# Patient Record
Sex: MALE | Race: OTHER | HISPANIC OR LATINO | Employment: OTHER | URBAN - METROPOLITAN AREA
[De-identification: names, ages, dates, MRNs, and addresses within clinical notes are randomized per-mention and may not be internally consistent; named-entity substitution may affect disease eponyms.]

---

## 2020-10-01 ENCOUNTER — HOSPITAL ENCOUNTER (EMERGENCY)
Facility: HOSPITAL | Age: 60
Discharge: HOME | End: 2020-10-01
Attending: EMERGENCY MEDICINE

## 2020-10-01 VITALS
RESPIRATION RATE: 18 BRPM | WEIGHT: 145.28 LBS | DIASTOLIC BLOOD PRESSURE: 88 MMHG | OXYGEN SATURATION: 98 % | TEMPERATURE: 97.3 F | HEART RATE: 71 BPM | SYSTOLIC BLOOD PRESSURE: 156 MMHG

## 2020-10-01 DIAGNOSIS — M54.9 BACK PAIN: Primary | ICD-10-CM

## 2020-10-01 PROCEDURE — 99283 EMERGENCY DEPT VISIT LOW MDM: CPT

## 2020-10-01 PROCEDURE — 99284 EMERGENCY DEPT VISIT MOD MDM: CPT | Performed by: EMERGENCY MEDICINE

## 2020-10-01 PROCEDURE — 96372 THER/PROPH/DIAG INJ SC/IM: CPT

## 2020-10-01 RX ORDER — LIDOCAINE 50 MG/G
1 PATCH TOPICAL ONCE
Status: DISCONTINUED | OUTPATIENT
Start: 2020-10-01 | End: 2020-10-01 | Stop reason: HOSPADM

## 2020-10-01 RX ORDER — CYCLOBENZAPRINE HCL 10 MG
10 TABLET ORAL 2 TIMES DAILY PRN
Qty: 20 TABLET | Refills: 0 | Status: SHIPPED | OUTPATIENT
Start: 2020-10-01

## 2020-10-01 RX ORDER — CYCLOBENZAPRINE HCL 10 MG
10 TABLET ORAL ONCE
Status: COMPLETED | OUTPATIENT
Start: 2020-10-01 | End: 2020-10-01

## 2020-10-01 RX ORDER — LIDOCAINE 50 MG/G
1 PATCH TOPICAL DAILY
Qty: 5 PATCH | Refills: 0 | Status: SHIPPED | OUTPATIENT
Start: 2020-10-01

## 2020-10-01 RX ORDER — TRAMADOL HYDROCHLORIDE 50 MG/1
50 TABLET ORAL EVERY 6 HOURS PRN
Qty: 8 TABLET | Refills: 0 | Status: SHIPPED | OUTPATIENT
Start: 2020-10-01 | End: 2020-10-03

## 2020-10-01 RX ORDER — KETOROLAC TROMETHAMINE 30 MG/ML
15 INJECTION, SOLUTION INTRAMUSCULAR; INTRAVENOUS ONCE
Status: COMPLETED | OUTPATIENT
Start: 2020-10-01 | End: 2020-10-01

## 2020-10-01 RX ORDER — ACETAMINOPHEN 325 MG/1
975 TABLET ORAL ONCE
Status: COMPLETED | OUTPATIENT
Start: 2020-10-01 | End: 2020-10-01

## 2020-10-01 RX ADMIN — LIDOCAINE 1 PATCH: 50 PATCH TOPICAL at 11:44

## 2020-10-01 RX ADMIN — CYCLOBENZAPRINE HYDROCHLORIDE 10 MG: 10 TABLET, FILM COATED ORAL at 11:42

## 2020-10-01 RX ADMIN — ACETAMINOPHEN 975 MG: 325 TABLET ORAL at 11:42

## 2020-10-01 RX ADMIN — KETOROLAC TROMETHAMINE 15 MG: 30 INJECTION, SOLUTION INTRAMUSCULAR; INTRAVENOUS at 11:41

## 2020-10-02 ENCOUNTER — TELEPHONE (OUTPATIENT)
Dept: PHYSICAL THERAPY | Facility: OTHER | Age: 60
End: 2020-10-02

## 2022-08-30 ENCOUNTER — HOSPITAL ENCOUNTER (EMERGENCY)
Facility: HOSPITAL | Age: 62
Discharge: HOME | End: 2022-08-30
Attending: EMERGENCY MEDICINE

## 2022-08-30 VITALS
TEMPERATURE: 97.5 F | SYSTOLIC BLOOD PRESSURE: 154 MMHG | OXYGEN SATURATION: 97 % | RESPIRATION RATE: 18 BRPM | DIASTOLIC BLOOD PRESSURE: 78 MMHG | HEART RATE: 64 BPM

## 2022-08-30 DIAGNOSIS — L25.5 RHUS DERMATITIS: Primary | ICD-10-CM

## 2022-08-30 PROCEDURE — 99284 EMERGENCY DEPT VISIT MOD MDM: CPT | Performed by: EMERGENCY MEDICINE

## 2022-08-30 PROCEDURE — 96372 THER/PROPH/DIAG INJ SC/IM: CPT

## 2022-08-30 PROCEDURE — 99283 EMERGENCY DEPT VISIT LOW MDM: CPT

## 2022-08-30 RX ORDER — PREDNISONE 10 MG/1
TABLET ORAL
Qty: 55 TABLET | Refills: 0 | Status: SHIPPED | OUTPATIENT
Start: 2022-08-30

## 2022-08-30 RX ORDER — KETOROLAC TROMETHAMINE 30 MG/ML
15 INJECTION, SOLUTION INTRAMUSCULAR; INTRAVENOUS ONCE
Status: COMPLETED | OUTPATIENT
Start: 2022-08-30 | End: 2022-08-30

## 2022-08-30 RX ORDER — ONDANSETRON 4 MG/1
4 TABLET, ORALLY DISINTEGRATING ORAL ONCE
Status: COMPLETED | OUTPATIENT
Start: 2022-08-30 | End: 2022-08-30

## 2022-08-30 RX ADMIN — ONDANSETRON 4 MG: 4 TABLET, ORALLY DISINTEGRATING ORAL at 13:44

## 2022-08-30 RX ADMIN — KETOROLAC TROMETHAMINE 15 MG: 30 INJECTION, SOLUTION INTRAMUSCULAR at 13:44

## 2022-08-30 RX ADMIN — PREDNISONE 50 MG: 20 TABLET ORAL at 13:44

## 2022-08-30 NOTE — ED ATTENDING ATTESTATION
8/30/2022  Cory BRUNO See, DO, saw and evaluated the patient  I have discussed the patient with the resident/non-physician practitioner and agree with the resident's/non-physician practitioner's findings, Plan of Care, and MDM as documented in the resident's/non-physician practitioner's note, except where noted  All available labs and Radiology studies were reviewed  I was present for key portions of any procedure(s) performed by the resident/non-physician practitioner and I was immediately available to provide assistance  At this point I agree with the current assessment done in the Emergency Department  I have conducted an independent evaluation of this patient a history and physical is as follows:  58 y o  male presents rash  Onset two days  Described as pruritic, burning Denies systemic sx other than nausea  Was camping over the weekend  No other modifying factors  Moderate severity  No other associated symptoms  Normal physical exam other than pertinent findings below  Vesicular erythematous rash bilateral shoulders, right arm with linear lesions and skipped areas   C/w rhus dermatitis        A/P: Rhus dermatitis  -calamine   -systemic corticosteroid taper for 14 days                          ED Course         Critical Care Time  Procedures

## 2022-08-30 NOTE — DISCHARGE INSTRUCTIONS
Your workup here was not concerning for anything dangerous  Therefore there is no need for you to stay at the hospital for further testing  We feel safe to send you home  You can use Prednisone for management of your symptoms  You should follow up with your primary care physician to assess for resolution of your symptoms and to determine if there is further evaluation that needs to be performed  Return to the emergency department if you have any symptoms of fevers or worsening rash   It may get worse initially but it will get better

## 2022-08-30 NOTE — ED PROVIDER NOTES
History  Chief Complaint   Patient presents with    Rash     P/t c/o rash  Went camping Saturday  Rash on shoulders and both arms     DILMA Roland is a 58 y o  male with no significant PMH coming in today with complaint of rash  He reports that this started on Saturday after camping trip  He noticed initial redness from what he thinks was contact with a plant on his left dorsum of his hand  It then began spreading to her shoulders and back after scratching his back  Reports chills and associated nausea  No alleviating factors  Reports the rash is painful  Denies any tick bite or sun exposure  No mucous membrane involvement  No recent medication changes  No genital lesions  He reports he smokes cigarettes however does not use other drugs or alcohol use  He has not seen a doctor however has no other known medical problems  No other complaints at this time       - No language barrier  -PFH/PSH reviewed  - History obtained from patient and chart    Prior to Admission Medications   Prescriptions Last Dose Informant Patient Reported? Taking? cyclobenzaprine (FLEXERIL) 10 mg tablet   No No   Sig: Take 1 tablet (10 mg total) by mouth 2 (two) times a day as needed for muscle spasms   lidocaine (LIDODERM) 5 %   No No   Sig: Apply 1 patch topically daily Remove & Discard patch within 12 hours or as directed by MD      Facility-Administered Medications: None       History reviewed  No pertinent past medical history  History reviewed  No pertinent surgical history  History reviewed  No pertinent family history  I have reviewed and agree with the history as documented  E-Cigarette/Vaping     E-Cigarette/Vaping Substances     Social History     Tobacco Use    Smoking status: Current Every Day Smoker     Types: Cigarettes    Smokeless tobacco: Never Used   Substance Use Topics    Alcohol use: Never    Drug use: Never        Review of Systems   Constitutional: Positive for chills  Negative for fever  HENT: Negative for sore throat  Eyes: Negative for pain and visual disturbance  Respiratory: Negative for shortness of breath  Cardiovascular: Negative for chest pain and palpitations  Gastrointestinal: Negative for abdominal pain and vomiting  Genitourinary: Negative for dysuria  Musculoskeletal: Negative for back pain  Skin: Positive for rash  Neurological: Negative for syncope  All other systems reviewed and are negative  Physical Exam  ED Triage Vitals   Temperature Pulse Respirations Blood Pressure SpO2   08/30/22 1148 08/30/22 1148 08/30/22 1148 08/30/22 1229 08/30/22 1148   97 5 °F (36 4 °C) 64 18 154/78 97 %      Temp Source Heart Rate Source Patient Position - Orthostatic VS BP Location FiO2 (%)   08/30/22 1148 08/30/22 1148 08/30/22 1148 08/30/22 1148 --   Tympanic Monitor Sitting Left arm       Pain Score       08/30/22 1148       7             Orthostatic Vital Signs  Vitals:    08/30/22 1148 08/30/22 1229   BP:  154/78   Pulse: 64    Patient Position - Orthostatic VS: Sitting Lying       Physical Exam  Vitals and nursing note reviewed  Constitutional:       Appearance: He is well-developed  HENT:      Head: Normocephalic and atraumatic  Eyes:      Conjunctiva/sclera: Conjunctivae normal    Cardiovascular:      Rate and Rhythm: Normal rate and regular rhythm  Heart sounds: No murmur heard  Pulmonary:      Effort: Pulmonary effort is normal  No respiratory distress  Breath sounds: Normal breath sounds  Abdominal:      Palpations: Abdomen is soft  Tenderness: There is no abdominal tenderness  There is no guarding or rebound  Musculoskeletal:      Cervical back: Neck supple  Skin:     General: Skin is warm and dry  Findings: Erythema and rash present        Comments: Deeply erythematous rash, blanchable, small vesicular lesions noted throughout, located on the back, dorsum of L hand, no mucous membrane involvement, negative lesions on the palms or soles, skip lesions noted on arm and lower back as well   Neurological:      General: No focal deficit present  Mental Status: He is alert  Psychiatric:         Mood and Affect: Mood normal          ED Medications  Medications   ketorolac (TORADOL) injection 15 mg (15 mg Intramuscular Given 8/30/22 1344)   ondansetron (ZOFRAN-ODT) dispersible tablet 4 mg (4 mg Oral Given 8/30/22 1344)   predniSONE tablet 50 mg (50 mg Oral Given 8/30/22 1344)       Diagnostic Studies  Results Reviewed     None                 No orders to display         Procedures  Procedures      ED Course                                       MDM  Number of Diagnoses or Management Options  Rhus dermatitis  Diagnosis management comments: Sylvia Lennox is a 58 y o  who presents with complaints of rash    Vital signs are stable, physical exam shows rash as described above    Dx: Poison ivy, contact dermatitis    Plan: Prednisone taper, PCP follow up, advised on return precautions  Risk of Complications, Morbidity, and/or Mortality  Presenting problems: low  Diagnostic procedures: minimal  Management options: minimal    Patient Progress  Patient progress: stable      Disposition  Final diagnoses:   Rhus dermatitis     Time reflects when diagnosis was documented in both MDM as applicable and the Disposition within this note     Time User Action Codes Description Comment    8/30/2022  1:22 PM Johnny MOORE Add [L25 5] Rhus dermatitis       ED Disposition     ED Disposition   Discharge    Condition   Stable    Date/Time   Tue Aug 30, 2022  1:22 PM    Comment   Sylvia Lennox discharge to home/self care                 Follow-up Information    None         Discharge Medication List as of 8/30/2022  1:34 PM      START taking these medications    Details   predniSONE 10 mg tablet Take 5 tablets for 5 days once a day  Take 4 tablets for 4 days  Take 3 tablets for 3 days  Take 2 tablets 2 days  Take 1 tablet for 1 day, Print         CONTINUE these medications which have NOT CHANGED    Details   cyclobenzaprine (FLEXERIL) 10 mg tablet Take 1 tablet (10 mg total) by mouth 2 (two) times a day as needed for muscle spasms, Starting u 10/1/2020, Normal      lidocaine (LIDODERM) 5 % Apply 1 patch topically daily Remove & Discard patch within 12 hours or as directed by MD, Starting u 10/1/2020, Normal           No discharge procedures on file  PDMP Review     None           ED Provider  Attending physically available and evaluated Alma Montana I managed the patient along with the ED Attending      Electronically Signed by         Laamr Sam MD  08/30/22 1252

## 2023-02-14 ENCOUNTER — APPOINTMENT (EMERGENCY)
Dept: RADIOLOGY | Facility: HOSPITAL | Age: 63
End: 2023-02-14

## 2023-02-14 ENCOUNTER — HOSPITAL ENCOUNTER (EMERGENCY)
Facility: HOSPITAL | Age: 63
Discharge: HOME | End: 2023-02-14
Attending: INTERNAL MEDICINE | Admitting: INTERNAL MEDICINE

## 2023-02-14 VITALS
OXYGEN SATURATION: 97 % | HEIGHT: 69 IN | HEART RATE: 79 BPM | BODY MASS INDEX: 21.45 KG/M2 | RESPIRATION RATE: 18 BRPM | DIASTOLIC BLOOD PRESSURE: 96 MMHG | TEMPERATURE: 98.6 F | SYSTOLIC BLOOD PRESSURE: 167 MMHG

## 2023-02-14 DIAGNOSIS — M25.562 LEFT KNEE PAIN: Primary | ICD-10-CM

## 2023-02-14 RX ORDER — KETOROLAC TROMETHAMINE 30 MG/ML
15 INJECTION, SOLUTION INTRAMUSCULAR; INTRAVENOUS ONCE
Status: COMPLETED | OUTPATIENT
Start: 2023-02-14 | End: 2023-02-14

## 2023-02-14 RX ADMIN — KETOROLAC TROMETHAMINE 15 MG: 30 INJECTION, SOLUTION INTRAMUSCULAR; INTRAVENOUS at 16:19

## 2023-02-14 NOTE — ED PROVIDER NOTES
History  Chief Complaint   Patient presents with   • Knee Injury     Pt reports L knee injury through work from tweaking it, was told he has arthritis from doctor, has been going to PT but pt stopped going to PT to see a doctor for confirmation of what is actually wrong with knee, pt took grand kids to park and heard a "snap in L knee"     78-year-old male with no known past medical history presents with left knee pain  Patient sustained an injury at work back in November where he slipped walking down stairs and twisted his left knee  He was evaluated at an urgent care at that time and apparently had an x-ray that showed arthritis of his left knee  Patient states that he saw physical therapy over at [facility]  He was given some exercises to do, but never followed up with physical therapy because of issues with Worker's Compensation  Patient states that he continued doing the exercises at home, but his knee pain has not improved  He states that he feels like his knee is going to give out at times  He notes locking of his knee  He sometimes hears clicking when he is doing the PT exercises  Patient has been taking Tylenol intermittently for the pain and states that it helps a little  He states that he has tried a compression sleeve, but this makes his lower extremity swell  Patient is here today because he had 2 incidences over the past week where he was walking downstairs and he heard a "pop" in his knee  Patient reports increased difficulty ambulating after the pop that he heard today  Prior to Admission Medications   Prescriptions Last Dose Informant Patient Reported? Taking?    cyclobenzaprine (FLEXERIL) 10 mg tablet   No No   Sig: Take 1 tablet (10 mg total) by mouth 2 (two) times a day as needed for muscle spasms   lidocaine (LIDODERM) 5 %   No No   Sig: Apply 1 patch topically daily Remove & Discard patch within 12 hours or as directed by MD   predniSONE 10 mg tablet   No No   Sig: Take 5 tablets for 5 days once a day  Take 4 tablets for 4 days  Take 3 tablets for 3 days  Take 2 tablets 2 days  Take 1 tablet for 1 day      Facility-Administered Medications: None       History reviewed  No pertinent past medical history  History reviewed  No pertinent surgical history  History reviewed  No pertinent family history  I have reviewed and agree with the history as documented  E-Cigarette/Vaping     E-Cigarette/Vaping Substances     Social History     Tobacco Use   • Smoking status: Every Day     Types: Cigarettes   • Smokeless tobacco: Never   Substance Use Topics   • Alcohol use: Never   • Drug use: Never        Review of Systems   Constitutional: Negative for appetite change, chills, fatigue and fever  HENT: Negative  Eyes: Negative  Respiratory: Negative for cough, chest tightness and shortness of breath  Cardiovascular: Negative for chest pain and palpitations  Gastrointestinal: Negative for abdominal pain, diarrhea, nausea and vomiting  Endocrine: Negative  Genitourinary: Negative for difficulty urinating and hematuria  Musculoskeletal: Positive for arthralgias  Negative for myalgias  Skin: Negative for pallor and rash  Allergic/Immunologic: Negative  Neurological: Negative for dizziness, weakness, light-headedness and headaches  Hematological: Negative  Physical Exam  ED Triage Vitals   Temperature Pulse Respirations Blood Pressure SpO2   02/14/23 1539 02/14/23 1539 02/14/23 1539 02/14/23 1539 02/14/23 1539   98 6 °F (37 °C) 79 18 167/96 97 %      Temp Source Heart Rate Source Patient Position - Orthostatic VS BP Location FiO2 (%)   02/14/23 1539 02/14/23 1539 02/14/23 1539 02/14/23 1539 --   Oral Monitor Sitting Left arm       Pain Score       02/14/23 1619       8             Orthostatic Vital Signs  Vitals:    02/14/23 1539   BP: 167/96   Pulse: 79   Patient Position - Orthostatic VS: Sitting       Physical Exam  Vitals and nursing note reviewed  Constitutional:       General: He is not in acute distress  Appearance: Normal appearance  He is not ill-appearing  HENT:      Head: Normocephalic and atraumatic  Nose: Nose normal    Eyes:      Conjunctiva/sclera: Conjunctivae normal    Cardiovascular:      Rate and Rhythm: Normal rate and regular rhythm  Pulmonary:      Effort: Pulmonary effort is normal  No respiratory distress  Abdominal:      General: Abdomen is flat  Palpations: Abdomen is soft  Musculoskeletal:         General: Normal range of motion  Cervical back: Normal range of motion and neck supple  Comments: Patient has slightly decreased extension of the left knee secondary to pain  Normal flexion  He has pain to palpation over the medial knee just inferior to the patella  No instability of the knee joint on valgus or varus stress or anterior or posterior drawer test   However, this was very limited secondary to pain  Skin:     General: Skin is warm and dry  Neurological:      General: No focal deficit present  Mental Status: He is alert and oriented to person, place, and time  Motor: No weakness  ED Medications  Medications   ketorolac (TORADOL) injection 15 mg (15 mg Intramuscular Given 2/14/23 1619)       Diagnostic Studies  Results Reviewed     None                 XR knee 4+ views left injury   ED Interpretation by Nuzhat Norman DO (02/14 1705)   No acute osseous process            Procedures  Procedures      ED Course                                       Medical Decision Making  77-year-old male presents with left knee pain  Patient notes locking, clicking, and a sensation that his knee is going to give out  I explained to patient that I am concerned for a meniscus or other kind of ligamentous tear  Explained to patient the importance of following up with orthopedics as he will need an MRI    I explained that we will likely not find any fractures on x-ray, but since he is going to follow-up with [facility] orthopedics, we can order an x-ray as they will want to see 1 and this will help him in the process of getting an MRI approved  We will give Toradol and reevaluate  Patient has no symptoms that would be concerning for emergent MRI in the ED  No need for labs as this would not aid in the diagnosis of a traumatic injury  Patient felt better after medications  He was put in a knee immobilizer  Recommend Tylenol or Motrin for pain  Discussed all results and plans with patient  Recommend follow up with orthopedics  Return precautions given  All questions answered  Left knee pain: acute illness or injury  Amount and/or Complexity of Data Reviewed  External Data Reviewed: notes  Details: Reviewed past medical history and PT notes  Radiology: ordered and independent interpretation performed  Risk  OTC drugs  Diagnosis or treatment significantly limited by social determinants of health  Risk Details: Patient is at increased risk secondary to poor follow-up with orthopedics in the past           Disposition  Final diagnoses:   Left knee pain     Time reflects when diagnosis was documented in both MDM as applicable and the Disposition within this note     Time User Action Codes Description Comment    2/14/2023  5:06 PM Sanjiv Doran Add [A88 458] Left knee pain       ED Disposition     ED Disposition   Discharge    Condition   Good    Date/Time   Tue Feb 14, 2023  5:06 PM    [address] discharge to home/self care                 Follow-up Information     Follow up With Specialties Details Why Contact Info Additional Information     [facility] Orthopedic Surgery   [id number]  [address], [address] [facility] [place], [address] [place] [address], [address]  Use Entrance A     Physical Therapy at [facility] Physical Therapy   [address] [facility]  [phone] Physical Therapy at [address] [place] [address], [facility] [address]          Discharge Medication List as of 2/14/2023  5:08 PM      CONTINUE these medications which have NOT CHANGED    Details   cyclobenzaprine (FLEXERIL) 10 mg tablet Take 1 tablet (10 mg total) by mouth 2 (two) times a day as needed for muscle spasms, Starting Thu 10/1/2020, Normal      lidocaine (LIDODERM) 5 % Apply 1 patch topically daily Remove & Discard patch within 12 hours or as directed by MD, Starting Thu 10/1/2020, Normal      predniSONE 10 mg tablet Take 5 tablets for 5 days once a day  Take 4 tablets for 4 days  Take 3 tablets for 3 days  Take 2 tablets 2 days  Take 1 tablet for 1 day, Print               PDMP Review     None           ED Provider  Attending physically available and evaluated Beronica Skinner I managed the patient along with the ED Attending      Electronically Signed by         Hosea Kirk DO  02/14/23 3564

## 2023-02-14 NOTE — DISCHARGE INSTRUCTIONS
You have been seen for knee pain  You should return to the ED if you develop inability to walk, fevers, or other worsening symptoms  Follow up with Orthopedics and PT  Take Tylenol or Motrin for pain  Use knee immobilizer if it helps your pain

## 2023-02-14 NOTE — ED ATTENDING ATTESTATION
2/14/2023  IJohn MD, saw and evaluated the patient  I have discussed the patient with the resident/non-physician practitioner and agree with the resident's/non-physician practitioner's findings, Plan of Care, and MDM as documented in the resident's/non-physician practitioner's note, except where noted  All available labs and Radiology studies were reviewed  I was present for key portions of any procedure(s) performed by the resident/non-physician practitioner and I was immediately available to provide assistance  At this point I agree with the current assessment done in the Emergency Department  I have conducted an independent evaluation of this patient a history and physical is as follows:    ED Course   58-year-old man with a history of arthritis presents to ED for evaluation of left knee injury  He reports he was taking his grandkids on a walk in the park and heard a snap in his left knee  He was previously going to physical therapy but stopped going to physical therapy  He denies any falls or trauma to the left knee  He is able to ambulate  Movement does make the knee worse  On exam the patient is alert and oriented  He appears comfortable  His heart rate is regular, his lungs are clear, his belly is soft  He moves all extremities  He has some tenderness to palpation over his left knee  He he has full passive range of motion  He has no palpable effusions or edema, no overlying skin changes, no warmth  Bilateral DP and PT pulses 2+  Will obtain x-ray of the left knee  Suspect this could be arthritis, differential also includes acute fracture, acute dislocation, tendon injury, ligament injury  We will also treat symptomatically  We will try knee immobilizer as well  We will have patient follow-up with physical therapy and orthopedics as needed        Critical Care Time  Procedures

## 2023-02-16 VITALS — BODY MASS INDEX: 22.66 KG/M2 | WEIGHT: 153 LBS | HEIGHT: 69 IN

## 2023-02-16 DIAGNOSIS — S89.92XA INJURY OF LEFT KNEE, INITIAL ENCOUNTER: Primary | ICD-10-CM

## 2023-02-16 DIAGNOSIS — M25.562 ACUTE PAIN OF LEFT KNEE: ICD-10-CM

## 2023-02-16 DIAGNOSIS — M25.562 LEFT KNEE PAIN: ICD-10-CM

## 2023-02-16 LAB
DME PARACHUTE DELIVERY DATE ACTUAL: NORMAL
DME PARACHUTE DELIVERY DATE REQUESTED: NORMAL
DME PARACHUTE ITEM DESCRIPTION: NORMAL
DME PARACHUTE ORDER STATUS: NORMAL
DME PARACHUTE SUPPLIER NAME: NORMAL
DME PARACHUTE SUPPLIER PHONE: NORMAL

## 2023-02-16 NOTE — LETTER
February 16, 2023     Patient: Brooks Verduzco  YOB: 1960  Date of Visit: 2/16/2023      To Whom it May Concern:    Brooks Verduzco is under my professional care  Jeanette Alfaro was seen in my office on 2/16/2023  I recommend against work at this time  Patient to be reevaluated after upcoming MRI is performed  If you have any questions or concerns, please don't hesitate to call           Sincerely,          Maldonado Mccollum III, DO        CC: Brooks Verduzco

## 2023-02-16 NOTE — PROGRESS NOTES
1  Injury of left knee, initial encounter  Brace    Crutches    MRI knee left  wo contrast      2  Acute pain of left knee        3  Left knee pain  Ambulatory Referral to Orthopedic Surgery        Orders Placed This Encounter   Procedures   • Brace   • Crutches   • MRI knee left  wo contrast        Imaging Studies (I personally reviewed images in PACS and report):  Left knee x-ray 2/14/2023: No acute osseous abnormality  IMPRESSION:  Left Knee Work Injury  Medial pain  DOI: 12/29/22      Repeat X-ray next visit: None    Return for Follow-up after MRI is completed for review  Patient Instructions   Start hinged knee brace  Recommend non weight bearing with crutches  Cease physical therapy until after mri reviewed by physician  Recommended against work at this time          CHIEF COMPLAINT:  Left knee pain    HPI:  Jayshree Odonnell is a 61 y o  male  who presents as a new patient for left knee pain  Pain is reportedly secondary to an injury that occurred at work and as such this is a Workmen's Compensation case  Date of injury 12/29/2022  Brief chart review shows physical therapy evaluation with [facility] on 1/31/2023, wherein patient reported slipping and twisting his left knee while carrying a heavy piece of furniture down the stairs at his job  He attended to sessions of formal physical therapy per chart review  He then presented to the ED for uncontrolled left knee pain after hearing a "pop" when descending stairs on 2/14/2023  He had x-rays in the ED which were read as normal   He was given IM Toradol with good effect  He was placed in a knee immobilizer and told to follow-up with orthopedics thereafter  Visit 2/16/2023 : Today, patient reports fall 12/29/22 when working as  and was struck on medial aspect of the knee, twisted, and fell to ground which is when pain began  Since then difficulty ambulating and walking  States hops around   Using office provided wheelchair today  Unable to tolerate PT  Review of Systems      Following history reviewed and update:    History reviewed  No pertinent past medical history  History reviewed  No pertinent surgical history  Social History   Social History     Substance and Sexual Activity   Alcohol Use Never     Social History     Substance and Sexual Activity   Drug Use Never     Social History     Tobacco Use   Smoking Status Every Day   • Types: Cigarettes   Smokeless Tobacco Never     History reviewed  No pertinent family history  Allergies   Allergen Reactions   • Penicillins Anaphylaxis          Physical Exam  Ht 5' 9" (1 753 m)   Wt 69 4 kg (153 lb)   BMI 22 59 kg/m²     Constitutional:  see vital signs  Gen: well-developed, normocephalic/atraumatic, well-groomed  Eyes: No inflammation or discharge of conjunctiva or lids; sclera clear   Pharynx: no inflammation, lesion, or mass of lips  Neck: supple, no masses, non-distended  MSK: no inflammation, lesion, mass, or clubbing of nails and digits except for other than mentioned below  SKIN: no visible rashes or skin lesions  Pulmonary/Chest: Effort normal  No respiratory distress     NEURO: cranial nerves grossly intact  PSYCH:  Alert and oriented to person, place, and time    Ortho Exam  LEFT KNEE:  Erythema: no  Swelling: no  Increased Warmth: no  Tenderness: patellar tendon, medial knee  Flexion: intact  Extension: intact  Lachman's: negative  Drawer: negative  Varus laxity: negative  Valgus laxity: +pain without laxity  Raul: unable to perform due to significant pain in office    Procedures

## 2023-02-16 NOTE — PATIENT INSTRUCTIONS
Start hinged knee brace  Recommend non weight bearing with crutches  Cease physical therapy until after mri reviewed by physician  Recommended against work at this time

## 2023-02-16 NOTE — TELEPHONE ENCOUNTER
Caller: Rayo Curiel    Doctor: Lex Warren    Reason for call: Checking on appt for today, he might need a wheel chair for his appt, patient states he is in a lot of pain in L Knee      Call back#: n/a

## 2023-02-17 NOTE — TELEPHONE ENCOUNTER
Caller: Patient    Doctor: Lisa Mireles    Reason for call:     Patient is requesting medication for spasms  For left knee, he uses [facility] in [address] [facility] on file      Call back#: [phone]

## 2023-02-21 ENCOUNTER — HOSPITAL ENCOUNTER (OUTPATIENT)
Dept: RADIOLOGY | Facility: HOSPITAL | Age: 63
Discharge: HOME | End: 2023-02-21
Attending: FAMILY MEDICINE

## 2023-02-21 DIAGNOSIS — S89.92XA INJURY OF LEFT KNEE, INITIAL ENCOUNTER: ICD-10-CM

## 2023-02-23 ENCOUNTER — OFFICE VISIT (OUTPATIENT)
Dept: OBGYN CLINIC | Facility: OTHER | Age: 63
End: 2023-02-23

## 2023-02-23 VITALS
HEIGHT: 69 IN | BODY MASS INDEX: 22.96 KG/M2 | WEIGHT: 155 LBS | DIASTOLIC BLOOD PRESSURE: 87 MMHG | SYSTOLIC BLOOD PRESSURE: 130 MMHG | HEART RATE: 73 BPM

## 2023-02-23 DIAGNOSIS — S83.242A ACUTE MEDIAL MENISCAL TEAR, LEFT, INITIAL ENCOUNTER: Primary | ICD-10-CM

## 2023-02-23 NOTE — PROGRESS NOTES
1  Acute medial meniscal tear, left, initial encounter  Ambulatory Referral to Orthopedic Surgery        Orders Placed This Encounter   Procedures   • Ambulatory Referral to Orthopedic Surgery        IMAGING STUDIES: (I personally reviewed images in PACS and report): MRI Left Knee 2/21/23:  Complex undersurface posterior horn medial meniscus tear with tiny flipped fragment extending into the meniscotibial recess  PAST REPORTS:        ASSESSMENT/PLAN:  Left Knee Medial Meniscal tear  Difficulty ambulating    Repeat X-ray next visit: None    Return for Follow-up with Surgeon  Patient Instructions   Continue non weight bearing crutches due to pain  Recommend opinion with surgeon  Letter given recommend against work due to difficulty ambulation        __________________________________________________________________________    HISTORY OF PRESENT ILLNESS:  F/u left knee injury: medial knee pain correlates with meniscal tear on mri  Having difficulty ambulating due to severe pain  Using crutches  Review of Systems      Following history reviewed and update:    No past medical history on file  No past surgical history on file  Social History   Social History     Substance and Sexual Activity   Alcohol Use Never     Social History     Substance and Sexual Activity   Drug Use Never     Social History     Tobacco Use   Smoking Status Every Day   • Types: Cigarettes   Smokeless Tobacco Never     No family history on file    Allergies   Allergen Reactions   • Penicillins Anaphylaxis          Physical Exam  /87 (BP Location: Left arm, Patient Position: Sitting, Cuff Size: Adult)   Pulse 73   Ht 5' 9" (1 753 m)   Wt 70 3 kg (155 lb)   BMI 22 89 kg/m²     Constitutional:  see vital signs  Gen: well-developed, normocephalic/atraumatic, well-groomed  Eyes: No inflammation or discharge of conjunctiva or lids; sclera clear   Pharynx: no inflammation, lesion, or mass of lips  Neck: supple, no masses, non-distended  MSK: no inflammation, lesion, mass, or clubbing of nails and digits except for other than mentioned below  SKIN: no visible rashes or skin lesions  Pulmonary/Chest: Effort normal  No respiratory distress     NEURO: cranial nerves grossly intact  PSYCH:  Alert and oriented to person, place, and time; recent and remote memory intact; mood normal, no depression, anxiety, or agitation, judgment and insight good and intact     Ortho Exam  LEFT KNEE:  Erythema: no  Swelling: no  Increased Warmth: no  Tenderness: +mjl  Flexion: seated at 90  Extension: intact  Drawer: negative  Varus laxity: negative  Valgus laxity: negative  [facility]: +medial pain    __________________________________________________________________________  Procedures

## 2023-02-23 NOTE — LETTER
February 23, 2023     Patient: Pamela Robison  YOB: 1960  Date of Visit: 2/23/2023      To Whom it May Concern:    Pamela Robison is under my professional care  Yash Bridgett was seen in my office on 2/23/2023  I recommend against work at this time  Patient to have evaluation by surgeon  about 3/13/23  If you have any questions or concerns, please don't hesitate to call           Sincerely,          Jeaneth Hodges III, DO        CC: No Recipients

## 2023-02-23 NOTE — PATIENT INSTRUCTIONS
Continue non weight bearing crutches due to pain  Recommend opinion with surgeon  Letter given recommend against work due to difficulty ambulation

## 2023-02-27 ENCOUNTER — CONSULT (OUTPATIENT)
Dept: OBGYN CLINIC | Facility: MEDICAL CENTER | Age: 63
End: 2023-02-27

## 2023-02-27 ENCOUNTER — APPOINTMENT (OUTPATIENT)
Dept: RADIOLOGY | Facility: MEDICAL CENTER | Age: 63
End: 2023-02-27

## 2023-02-27 VITALS
WEIGHT: 160 LBS | DIASTOLIC BLOOD PRESSURE: 88 MMHG | BODY MASS INDEX: 23.7 KG/M2 | HEART RATE: 80 BPM | HEIGHT: 69 IN | SYSTOLIC BLOOD PRESSURE: 138 MMHG

## 2023-02-27 DIAGNOSIS — S83.242A ACUTE MEDIAL MENISCAL TEAR, LEFT, INITIAL ENCOUNTER: ICD-10-CM

## 2023-02-27 DIAGNOSIS — S83.242A ACUTE MEDIAL MENISCAL TEAR, LEFT, INITIAL ENCOUNTER: Primary | ICD-10-CM

## 2023-02-27 DIAGNOSIS — Z01.89 ENCOUNTER FOR LOWER EXTREMITY COMPARISON IMAGING STUDY: ICD-10-CM

## 2023-02-27 RX ORDER — CHLORHEXIDINE GLUCONATE 0.12 MG/ML
15 RINSE ORAL ONCE
OUTPATIENT
Start: 2023-02-27 | End: 2023-02-27

## 2023-02-27 RX ORDER — CEFAZOLIN SODIUM 1 G/50ML
1000 SOLUTION INTRAVENOUS ONCE
OUTPATIENT
Start: 2023-02-27 | End: 2023-02-27

## 2023-02-27 NOTE — H&P (VIEW-ONLY)
Orthopaedic Surgery - Office Note  Mariel Ruiz (72 y o  male)   : 1960   MRN: [id number]  Encounter Date: 2023    Chief Complaint   Patient presents with   • Left Knee - Pain       Assessment / Plan  Left knee posterior horn tear of medial mensicus     · The diagnosis and treatment options were reviewed  · The patient wishes to proceed with left knee medial meniscus repair vs partial meniscectomy  Scheduled for 2023  · The risks, benefits, and alternatives were discussed  · Written consent was obtained  · Patient will be out of work for 4-6 weeks post operatively   · Ordered and reviewed XR today in the office, reviewed recent MRI  · Reviewed notes from Dr Venancio Moe  · Continue with ice, heat and anti-inflammatories prn   Return for 4-5 days post op with Arturo Woodward  History of Present Illness  Mariel Ruiz is a 61 y o  male who presents for evaluation of left knee pain and meniscus tear at the request of Dr Venancio Moe  He was injured at work on 2022 when he slipped and twisted his left knee while carrying a heavy piece of furniture down the stairs  He has been in PT which has resulted in no relief in symptoms  He did have a Toradol injection no relief in symptoms  He continues to have pain walking and has a daily catching and locking sensation in his knee  He describes his pain as being medial  He does have a antalgic gait due to pain and locking  He has been out of work, this is a workers compensation claim  Review of Systems  Pertinent items are noted in HPI  All other systems were reviewed and are negative  Physical Exam  /88   Pulse 80   Ht 5' 9" (1 753 m)   Wt 72 6 kg (160 lb)   BMI 23 63 kg/m²   Cons: Appears well  No apparent distress  Psych: Alert  Oriented x3  Mood and affect normal   Eyes: PERRLA, EOMI  Resp: Normal effort  No audible wheezing or stridor  CV: Palpable pulse  No discernable arrhythmia  No LE edema    Lymph:  No palpable cervical, axillary, or inguinal lymphadenopathy  Skin: Warm  No palpable masses  No visible lesions  Neuro: Normal muscle tone  Normal and symmetric DTR's  Left Knee Exam  Alignment:  Normal knee alignment  Inspection:  No swelling  No ecchymosis  Palpation:  moderate medial  tenderness  No effusion  ROM:  Knee Extension 0  Knee Flexion 125  Strength:  Able to SLR without lag  Stability:  No objective knee instability  Stable Varus / Valgus stress, Lachman, and Posterior drawer  Tests:  (+) Consuelo  Patella:  Normal patellar mobility  Neurovascular:  Sensation intact in DP/SP/Zamora/Sa/T nerve distributions  2+ DP & PT pulses  Gait:  Antalgic  Studies Reviewed  I have personally reviewed pertinent films in PACS  XR of left knee - images from 02/27/2023 showing very mild narrowing of joint space  MRI of left knee - images from 02/21/2023 showing complex undersurface posterior horn medial meniscus tear     Procedures  No procedures today  Medical, Surgical, Family, and Social History  The patient's medical history, family history, and social history, were reviewed and updated as appropriate  History reviewed  No pertinent past medical history  History reviewed  No pertinent surgical history  History reviewed  No pertinent family history  Social History     Occupational History   • Not on file   Tobacco Use   • Smoking status: Every Day     Types: Cigarettes   • Smokeless tobacco: Never   Substance and Sexual Activity   • Alcohol use: Never   • Drug use: Never   • Sexual activity: Not on file       Allergies   Allergen Reactions   • Penicillins Anaphylaxis       No current outpatient medications on file        Carol Baptiste    Scribe Attestation    I,:  Carol Baptiste am acting as a scribe while in the presence of the attending physician :       I,:  Mariam Dixon MD personally performed the services described in this documentation    as scribed in my presence :

## 2023-02-27 NOTE — PROGRESS NOTES
Orthopaedic Surgery - Office Note  Brayan Holly (9 y o  male)   : 1960   MRN: [id number]  Encounter Date: 2023    Chief Complaint   Patient presents with   • Left Knee - Pain       Assessment / Plan  Left knee posterior horn tear of medial mensicus     · The diagnosis and treatment options were reviewed  · The patient wishes to proceed with left knee medial meniscus repair vs partial meniscectomy  Scheduled for 2023  · The risks, benefits, and alternatives were discussed  · Written consent was obtained  · Patient will be out of work for 4-6 weeks post operatively   · Ordered and reviewed XR today in the office, reviewed recent MRI  · Reviewed notes from Dr Marleny Madrigal  · Continue with ice, heat and anti-inflammatories prn   Return for 4-5 days post op with Licha Hair  History of Present Illness  Brayan Holly is a 61 y o  male who presents for evaluation of left knee pain and meniscus tear at the request of Dr Marleny Madrigal  He was injured at work on 2022 when he slipped and twisted his left knee while carrying a heavy piece of furniture down the stairs  He has been in PT which has resulted in no relief in symptoms  He did have a Toradol injection no relief in symptoms  He continues to have pain walking and has a daily catching and locking sensation in his knee  He describes his pain as being medial  He does have a antalgic gait due to pain and locking  He has been out of work, this is a workers compensation claim  Review of Systems  Pertinent items are noted in HPI  All other systems were reviewed and are negative  Physical Exam  /88   Pulse 80   Ht 5' 9" (1 753 m)   Wt 72 6 kg (160 lb)   BMI 23 63 kg/m²   Cons: Appears well  No apparent distress  Psych: Alert  Oriented x3  Mood and affect normal   Eyes: PERRLA, EOMI  Resp: Normal effort  No audible wheezing or stridor  CV: Palpable pulse  No discernable arrhythmia  No LE edema    Lymph:  No palpable cervical, axillary, or inguinal lymphadenopathy  Skin: Warm  No palpable masses  No visible lesions  Neuro: Normal muscle tone  Normal and symmetric DTR's  Left Knee Exam  Alignment:  Normal knee alignment  Inspection:  No swelling  No ecchymosis  Palpation:  moderate medial  tenderness  No effusion  ROM:  Knee Extension 0  Knee Flexion 125  Strength:  Able to SLR without lag  Stability:  No objective knee instability  Stable Varus / Valgus stress, Lachman, and Posterior drawer  Tests:  (+) Consuelo  Patella:  Normal patellar mobility  Neurovascular:  Sensation intact in DP/SP/Zamora/Sa/T nerve distributions  2+ DP & PT pulses  Gait:  Antalgic  Studies Reviewed  I have personally reviewed pertinent films in PACS  XR of left knee - images from 02/27/2023 showing very mild narrowing of joint space  MRI of left knee - images from 02/21/2023 showing complex undersurface posterior horn medial meniscus tear     Procedures  No procedures today  Medical, Surgical, Family, and Social History  The patient's medical history, family history, and social history, were reviewed and updated as appropriate  History reviewed  No pertinent past medical history  History reviewed  No pertinent surgical history  History reviewed  No pertinent family history  Social History     Occupational History   • Not on file   Tobacco Use   • Smoking status: Every Day     Types: Cigarettes   • Smokeless tobacco: Never   Substance and Sexual Activity   • Alcohol use: Never   • Drug use: Never   • Sexual activity: Not on file       Allergies   Allergen Reactions   • Penicillins Anaphylaxis       No current outpatient medications on file        Nicholas Mcintyre    Scribe Attestation    I,:  Nicholas Mcintyre am acting as a scribe while in the presence of the attending physician :       I,:  Lupis Taylor MD personally performed the services described in this documentation    as scribed in my presence :

## 2023-03-07 ENCOUNTER — OFFICE VISIT (OUTPATIENT)
Dept: LAB | Age: 63
End: 2023-03-07

## 2023-03-07 DIAGNOSIS — Z01.818 ENCOUNTER FOR PREADMISSION TESTING: ICD-10-CM

## 2023-03-07 LAB
ATRIAL RATE: 68 BPM
P AXIS: 78 DEGREES
PR INTERVAL: 166 MS
QRS AXIS: -2 DEGREES
QRSD INTERVAL: 84 MS
QT INTERVAL: 362 MS
QTC INTERVAL: 384 MS
T WAVE AXIS: 60 DEGREES
VENTRICULAR RATE: 68 BPM

## 2023-03-12 ENCOUNTER — ANESTHESIA EVENT (OUTPATIENT)
Dept: PERIOP | Facility: HOSPITAL | Age: 63
End: 2023-03-12

## 2023-03-14 ENCOUNTER — HOSPITAL ENCOUNTER (OUTPATIENT)
Facility: HOSPITAL | Age: 63
Setting detail: OUTPATIENT SURGERY
Discharge: HOME | End: 2023-03-14
Attending: ORTHOPAEDIC SURGERY | Admitting: ORTHOPAEDIC SURGERY

## 2023-03-14 ENCOUNTER — ANESTHESIA (OUTPATIENT)
Dept: PERIOP | Facility: HOSPITAL | Age: 63
End: 2023-03-14

## 2023-03-14 VITALS
WEIGHT: 161.38 LBS | RESPIRATION RATE: 16 BRPM | SYSTOLIC BLOOD PRESSURE: 129 MMHG | OXYGEN SATURATION: 97 % | BODY MASS INDEX: 23.83 KG/M2 | TEMPERATURE: 97.5 F | DIASTOLIC BLOOD PRESSURE: 76 MMHG | HEART RATE: 65 BPM

## 2023-03-14 DIAGNOSIS — S89.92XA INJURY OF LEFT KNEE, INITIAL ENCOUNTER: Primary | ICD-10-CM

## 2023-03-14 PROBLEM — F17.210 LIGHT SMOKER: Status: ACTIVE | Noted: 2023-03-14

## 2023-03-14 RX ORDER — OXYCODONE HYDROCHLORIDE 5 MG/1
10 TABLET ORAL EVERY 4 HOURS PRN
Status: DISCONTINUED | OUTPATIENT
Start: 2023-03-14 | End: 2023-03-14 | Stop reason: HOSPADM

## 2023-03-14 RX ORDER — ASPIRIN 81 MG/1
81 TABLET ORAL 2 TIMES DAILY
Qty: 60 TABLET | Refills: 0 | Status: SHIPPED | OUTPATIENT
Start: 2023-03-14 | End: 2023-03-14 | Stop reason: SDUPTHER

## 2023-03-14 RX ORDER — CLINDAMYCIN PHOSPHATE 900 MG/50ML
900 INJECTION INTRAVENOUS
Status: COMPLETED | OUTPATIENT
Start: 2023-03-14 | End: 2023-03-14

## 2023-03-14 RX ORDER — ONDANSETRON 2 MG/ML
INJECTION INTRAMUSCULAR; INTRAVENOUS AS NEEDED
Status: DISCONTINUED | OUTPATIENT
Start: 2023-03-14 | End: 2023-03-14

## 2023-03-14 RX ORDER — ONDANSETRON 4 MG/1
4 TABLET, ORALLY DISINTEGRATING ORAL EVERY 8 HOURS PRN
Qty: 15 TABLET | Refills: 0 | Status: SHIPPED | OUTPATIENT
Start: 2023-03-14 | End: 2023-03-14 | Stop reason: SDUPTHER

## 2023-03-14 RX ORDER — OXYCODONE HYDROCHLORIDE 5 MG/1
5 TABLET ORAL EVERY 4 HOURS PRN
Qty: 40 TABLET | Refills: 0 | Status: SHIPPED | OUTPATIENT
Start: 2023-03-14 | End: 2023-03-14 | Stop reason: SDUPTHER

## 2023-03-14 RX ORDER — LIDOCAINE HYDROCHLORIDE 20 MG/ML
INJECTION, SOLUTION EPIDURAL; INFILTRATION; INTRACAUDAL; PERINEURAL AS NEEDED
Status: DISCONTINUED | OUTPATIENT
Start: 2023-03-14 | End: 2023-03-14

## 2023-03-14 RX ORDER — HYDROMORPHONE HCL/PF 1 MG/ML
0.5 SYRINGE (ML) INJECTION
Status: DISCONTINUED | OUTPATIENT
Start: 2023-03-14 | End: 2023-03-14 | Stop reason: HOSPADM

## 2023-03-14 RX ORDER — ASPIRIN 81 MG/1
81 TABLET ORAL 2 TIMES DAILY
Qty: 60 TABLET | Refills: 0 | Status: SHIPPED | OUTPATIENT
Start: 2023-03-14 | End: 2023-04-13

## 2023-03-14 RX ORDER — ROPIVACAINE HYDROCHLORIDE 5 MG/ML
INJECTION, SOLUTION EPIDURAL; INFILTRATION; PERINEURAL AS NEEDED
Status: DISCONTINUED | OUTPATIENT
Start: 2023-03-14 | End: 2023-03-14

## 2023-03-14 RX ORDER — OXYCODONE HYDROCHLORIDE 5 MG/1
5 TABLET ORAL EVERY 4 HOURS PRN
Status: DISCONTINUED | OUTPATIENT
Start: 2023-03-14 | End: 2023-03-14 | Stop reason: HOSPADM

## 2023-03-14 RX ORDER — ONDANSETRON 4 MG/1
4 TABLET, ORALLY DISINTEGRATING ORAL EVERY 8 HOURS PRN
Qty: 15 TABLET | Refills: 0 | Status: SHIPPED | OUTPATIENT
Start: 2023-03-14

## 2023-03-14 RX ORDER — FENTANYL CITRATE/PF 50 MCG/ML
25 SYRINGE (ML) INJECTION
Status: DISCONTINUED | OUTPATIENT
Start: 2023-03-14 | End: 2023-03-14 | Stop reason: HOSPADM

## 2023-03-14 RX ORDER — CHLORHEXIDINE GLUCONATE 0.12 MG/ML
15 RINSE ORAL ONCE
Status: COMPLETED | OUTPATIENT
Start: 2023-03-14 | End: 2023-03-14

## 2023-03-14 RX ORDER — MIDAZOLAM HYDROCHLORIDE 2 MG/2ML
INJECTION, SOLUTION INTRAMUSCULAR; INTRAVENOUS AS NEEDED
Status: DISCONTINUED | OUTPATIENT
Start: 2023-03-14 | End: 2023-03-14

## 2023-03-14 RX ORDER — PROPOFOL 10 MG/ML
INJECTION, EMULSION INTRAVENOUS AS NEEDED
Status: DISCONTINUED | OUTPATIENT
Start: 2023-03-14 | End: 2023-03-14

## 2023-03-14 RX ORDER — DEXAMETHASONE SODIUM PHOSPHATE 10 MG/ML
INJECTION, SOLUTION INTRAMUSCULAR; INTRAVENOUS AS NEEDED
Status: DISCONTINUED | OUTPATIENT
Start: 2023-03-14 | End: 2023-03-14

## 2023-03-14 RX ORDER — LIDOCAINE HYDROCHLORIDE AND EPINEPHRINE 20; 5 MG/ML; UG/ML
INJECTION, SOLUTION EPIDURAL; INFILTRATION; INTRACAUDAL; PERINEURAL AS NEEDED
Status: DISCONTINUED | OUTPATIENT
Start: 2023-03-14 | End: 2023-03-14

## 2023-03-14 RX ORDER — FENTANYL CITRATE 50 UG/ML
INJECTION, SOLUTION INTRAMUSCULAR; INTRAVENOUS AS NEEDED
Status: DISCONTINUED | OUTPATIENT
Start: 2023-03-14 | End: 2023-03-14

## 2023-03-14 RX ORDER — ONDANSETRON 2 MG/ML
4 INJECTION INTRAMUSCULAR; INTRAVENOUS ONCE AS NEEDED
Status: DISCONTINUED | OUTPATIENT
Start: 2023-03-14 | End: 2023-03-14 | Stop reason: HOSPADM

## 2023-03-14 RX ORDER — SODIUM CHLORIDE, SODIUM LACTATE, POTASSIUM CHLORIDE, CALCIUM CHLORIDE 600; 310; 30; 20 MG/100ML; MG/100ML; MG/100ML; MG/100ML
125 INJECTION, SOLUTION INTRAVENOUS CONTINUOUS
Status: DISCONTINUED | OUTPATIENT
Start: 2023-03-14 | End: 2023-03-14 | Stop reason: HOSPADM

## 2023-03-14 RX ORDER — KETOROLAC TROMETHAMINE 30 MG/ML
INJECTION, SOLUTION INTRAMUSCULAR; INTRAVENOUS AS NEEDED
Status: DISCONTINUED | OUTPATIENT
Start: 2023-03-14 | End: 2023-03-14

## 2023-03-14 RX ORDER — OXYCODONE HYDROCHLORIDE 5 MG/1
5 TABLET ORAL EVERY 4 HOURS PRN
Qty: 40 TABLET | Refills: 0 | Status: SHIPPED | OUTPATIENT
Start: 2023-03-14 | End: 2023-03-24

## 2023-03-14 RX ADMIN — PROPOFOL 160 MG: 10 INJECTION, EMULSION INTRAVENOUS at 13:03

## 2023-03-14 RX ADMIN — MIDAZOLAM 4 MG: 1 INJECTION INTRAMUSCULAR; INTRAVENOUS at 12:48

## 2023-03-14 RX ADMIN — KETOROLAC TROMETHAMINE 30 MG: 30 INJECTION, SOLUTION INTRAMUSCULAR at 13:22

## 2023-03-14 RX ADMIN — ONDANSETRON 4 MG: 2 INJECTION INTRAMUSCULAR; INTRAVENOUS at 13:20

## 2023-03-14 RX ADMIN — DEXAMETHASONE SODIUM PHOSPHATE 10 MG: 10 INJECTION INTRAMUSCULAR; INTRAVENOUS at 13:17

## 2023-03-14 RX ADMIN — ROPIVACAINE HYDROCHLORIDE 20 ML: 5 INJECTION EPIDURAL; INFILTRATION; PERINEURAL at 12:48

## 2023-03-14 RX ADMIN — CLINDAMYCIN PHOSPHATE 900 MG: 900 INJECTION, SOLUTION INTRAVENOUS at 12:58

## 2023-03-14 RX ADMIN — SODIUM CHLORIDE, SODIUM LACTATE, POTASSIUM CHLORIDE, AND CALCIUM CHLORIDE 125 ML/HR: .6; .31; .03; .02 INJECTION, SOLUTION INTRAVENOUS at 14:17

## 2023-03-14 RX ADMIN — LIDOCAINE HYDROCHLORIDE AND EPINEPHRINE 30 ML: 20; 5 INJECTION, SOLUTION EPIDURAL; INFILTRATION; INTRACAUDAL; PERINEURAL at 12:48

## 2023-03-14 RX ADMIN — CHLORHEXIDINE GLUCONATE 0.12% ORAL RINSE 15 ML: 1.2 LIQUID ORAL at 11:16

## 2023-03-14 RX ADMIN — FENTANYL CITRATE 100 MCG: 50 INJECTION INTRAMUSCULAR; INTRAVENOUS at 12:48

## 2023-03-14 RX ADMIN — SODIUM CHLORIDE, SODIUM LACTATE, POTASSIUM CHLORIDE, AND CALCIUM CHLORIDE: .6; .31; .03; .02 INJECTION, SOLUTION INTRAVENOUS at 13:14

## 2023-03-14 RX ADMIN — SODIUM CHLORIDE, SODIUM LACTATE, POTASSIUM CHLORIDE, AND CALCIUM CHLORIDE 125 ML/HR: .6; .31; .03; .02 INJECTION, SOLUTION INTRAVENOUS at 11:16

## 2023-03-14 RX ADMIN — LIDOCAINE HYDROCHLORIDE 80 MG: 20 INJECTION, SOLUTION EPIDURAL; INFILTRATION; INTRACAUDAL; PERINEURAL at 13:03

## 2023-03-14 NOTE — OP NOTE
OPERATIVE REPORT    PATIENT NAME: Maximo Opitz   :  1960  MRN: [id number]  Pt Location: [facility] OR ROOM 01    SURGERY DATE: 3/14/2023    SURGEON(S) and ROLE:  Primary: Angie Hdz MD    NOTE:  No qualified resident or physician assistant was available for the case  PREOPERATIVE DIAGNOSES:  Left Knee  Medial Meniscus Tear    POSTOPERATIVE DIAGNOSES:  Left Knee  Medial Meniscus Tear    PROCEDURES:  Left Knee Arthroscopy with:  Partial Medial Meniscectomy      ANESTHESIA TYPE:  General LMA and Intra-articular block    ANESTHESIA STAFF:   Anesthesiologist: Mary Ellen Lin DO  CRNA: Geri Melton CRNA    ESTIMATED BLOOD LOSS:  5 mL    TOURNIQUET TIME:  Not used    PERIOPERATIVE ANTIBIOTICS:  clindamycin, 900 mg    IMPLANTS:  none    * No implants in log *    SPECIMENS:  * No specimens in log *    DRAINS:  None      OPERATIVE INDICATIONS:  The patient is a 61 y o  male with left knee pain and a medial meniscus tear  Surgical treatment was indicated due to persistent symptoms despite non-surgical treatment  After a thorough discussion of the potential risks, benefits, and alternative treatments, the patient agreed to proceed with surgery  The patient understands that the risks of surgery include, but are not limited to: infection, neurovascular injury, wound healing complications, venous thromboembolism, persistent pain, stiffness, instability, recurrence of symptoms, potential need for additional surgeries, and loss of limb or life  Oral and written consent for surgery was obtained from the patient preoperatively  EXAM UNDER ANESTHESIA:  Neutral alignment  ROM:  0-135 degrees  Ligaments stable to varus stress / valgus stress / Lachman / posterior drawer; negative pivot-shift  Patella tracking normal  without crepitus  PROCEDURE AND TECHNIQUE:  On the day of surgery, the patient was identified in the preoperative holding area  The operative site was marked by the surgeon    The patient was taken into the operating room  A time-out was conducted to confirm the patient's identity, the operative site, and the proposed procedure  The patient was anesthetized, and perioperative antibiotics were administered  The patient was positioned supine on the OR table  All bony prominences were padded  A tourniquet was not used  The operative site was prepped and draped using standard sterile technique  An anterolateral portal was established, and the arthroscope was inserted into the knee joint  An anteromedial portal was established under direct visualization, and diagnostic arthroscopy was performed  The joint demonstrated mild synovitis  In the patellofemoral compartment, the trochlea demonstrated diffuse grade 2 chondral wear which was treated with chondroplasty to remove all loose flaps of tissue   The patella demonstrated pristine articular cartilage  The patella tracking was normal        In the medial compartment, the medial femoral condyle demonstrated pristine articular cartilage  The medial tibial plateau demonstrated pristine articular cartilage  The medial meniscus had a complex tear involving the posterior horn and body  The torn portion of the meniscus was removed and debrided to a stable base with a basket and motorized shaver  25% of the meniscus width remained intact  In the lateral compartment, the lateral femoral condyle demonstrated pristine articular cartilage  The lateral tibial plateau demonstrated pristine articular cartilage  The lateral meniscus was intact       In the intercondylar notch, the PCL was intact  The ACL was intact  At the conclusion of the procedure, the instruments were withdrawn  The portals and incisions were closed with absorbable sutures and steri-strips  A sterile dressing was applied  The surgical drapes were removed  A soft bandage was applied to the operative knee    The patient was awakened from anesthesia and transported to the PACU in stable condition        COMPLICATIONS:  None    PATIENT DISPOSITION:  PACU       SIGNATURE:  Colletta Pitt, MD  DATE:  March 14, 2023  TIME:  1:55 PM

## 2023-03-14 NOTE — ANESTHESIA PREPROCEDURE EVALUATION
Procedure:  ARTHROSCOPY KNEE partial meniscectomy vs repair (Left: Knee)    Relevant Problems   PULMONARY   (+) Light smoker      Other   (+) Injury of left knee   (+) Left knee pain        Physical Exam    Airway    Mallampati score: II  TM Distance: >3 FB  Neck ROM: full     Dental   Comment: partial, upper dentures,     Cardiovascular  Rhythm: regular, Rate: normal, Cardiovascular exam normal    Pulmonary  Pulmonary exam normal Breath sounds clear to auscultation,     Other Findings        Anesthesia Plan  ASA Score- 2     Anesthesia Type-         Additional Monitors:   Airway Plan: LMA  Comment: Intra-articular block preop--for postop pain control--discussed with patient preoperatively          Plan Factors-    Chart reviewed  Patient summary reviewed  Patient is a current smoker  Patient instructed to abstain from smoking on day of procedure  Patient did not smoke on day of surgery  There is medical exclusion for perioperative obstructive sleep apnea risk education  Induction- intravenous  Postoperative Plan-     Informed Consent- Anesthetic plan and risks discussed with patient

## 2023-03-14 NOTE — ANESTHESIA POSTPROCEDURE EVALUATION
Post-Op Assessment Note    CV Status:  Stable    Pain management: adequate     Mental Status:  Alert and awake   Hydration Status:  Euvolemic   PONV Controlled:  Controlled   Airway Patency:  Patent      Post Op Vitals Reviewed: Yes      Staff: Anesthesiologist         No notable events documented      BP      Temp     Pulse     Resp      SpO2      /65   Pulse 62   Temp (!) 97 °F (36 1 °C)   Resp 19   Wt 73 2 kg (161 lb 6 oz)   SpO2 99%   BMI 23 83 kg/m²

## 2023-03-14 NOTE — INTERVAL H&P NOTE
H&P reviewed  After examining the patient I find no changes in the patients condition since the H&P had been written      Vitals:    03/14/23 1124   BP: 145/79   Pulse: 61   Resp: 16   Temp: 97 7 °F (36 5 °C)   SpO2: 94%

## 2023-03-14 NOTE — ANESTHESIA PROCEDURE NOTES
Peripheral Block    Patient location during procedure: pre-op  Start time: 3/14/2023 12:48 PM  Reason for block: procedure for pain, at surgeon's request and post-op pain management  Staffing  Performed: Anesthesiologist   Anesthesiologist: Kianna Mckeon DO  Preanesthetic Checklist  Completed: patient identified, IV checked, site marked, risks and benefits discussed, surgical consent, monitors and equipment checked, pre-op evaluation and timeout performed  Peripheral Block  Patient position: sitting  Prep: ChloraPrep  Patient monitoring: heart rate, cardiac monitor, continuous pulse ox and frequent blood pressure checks  Block type:  Intra-articular  Laterality: left  Injection technique: single-shot  Needle  Needle localization: anatomical landmarks  Assessment  Injection assessment: incremental injection, negative aspiration for heme and no paresthesia on injection  Paresthesia pain: none  Heart rate change: no  Slow fractionated injection: yes  Post-procedure:  site cleaned  patient tolerated the procedure well with no immediate complications

## 2023-03-24 ENCOUNTER — OFFICE VISIT (OUTPATIENT)
Dept: OBGYN CLINIC | Facility: MEDICAL CENTER | Age: 63
End: 2023-03-24

## 2023-03-24 VITALS
BODY MASS INDEX: 23.85 KG/M2 | HEIGHT: 69 IN | SYSTOLIC BLOOD PRESSURE: 151 MMHG | HEART RATE: 66 BPM | WEIGHT: 161 LBS | DIASTOLIC BLOOD PRESSURE: 88 MMHG

## 2023-03-24 DIAGNOSIS — S83.242A ACUTE MEDIAL MENISCAL TEAR, LEFT, INITIAL ENCOUNTER: Primary | ICD-10-CM

## 2023-03-24 NOTE — PROGRESS NOTES
Orthopaedic Surgery - Office Note  Sevreo Cochran (37 y o  male)   : 1960   MRN: [id number]  Encounter Date: 3/24/2023    Chief Complaint   Patient presents with   • Left Knee - Post-op       Assessment / Plan  s/p left knee arthroscopic partial medial menisectomy 3/14/23- good progression     · Begin outpatient PT per protocol  · Patient may return to sedentary duty limited walking and standing  Return in about 6 weeks (around 2023)  History of Present Illness  Severo Cochran is a 61 y o  male who presents for his 1st PO s/p left knee arthroscopic partial medial menisectomy 3/14/23  Patient states he has been taking OTC medication for pain  He reports episodes of the knee giving out  He has not been using crutches  Review of Systems  Pertinent items are noted in HPI  All other systems were reviewed and are negative  Physical Exam  /88   Pulse 66   Ht 5' 9" (1 753 m)   Wt 73 kg (161 lb)   BMI 23 78 kg/m²   Cons: Appears well  No apparent distress  Psych: Alert  Oriented x3  Mood and affect normal   Eyes: PERRLA, EOMI  Resp: Normal effort  No audible wheezing or stridor  CV: Palpable pulse  No discernable arrhythmia  No LE edema  Lymph:  No palpable cervical, axillary, or inguinal lymphadenopathy  Skin: Warm  No palpable masses  No visible lesions  Neuro: Normal muscle tone  Normal and symmetric DTR's  Left Knee Exam  Alignment:  Normal knee alignment  Inspection:  moderate swelling  moderate effusion  Palpation:  mild tenderness  ROM:  Knee Extension 0  Knee Flexion 120  Strength:  Able to actively extend knee against gravity  Stability:  Not tested  Tests:  No pertinent positive or negative tests  Patella:  Not tested  Neurovascular:  Sensation intact in DP/SP/Zamora/Sa/T nerve distributions  2+ DP & PT pulses  Gait:  Normal     Studies Reviewed  No studies to review    Procedures  No procedures today      Medical, Surgical, Family, and Social History  The patient's medical history, family history, and social history, were reviewed and updated as appropriate  History reviewed  No pertinent past medical history  Past Surgical History:   Procedure Laterality Date   • COLONOSCOPY     • HEMORRHOID SURGERY     • AR ARTHRS KNE SURG W/MENISCECTOMY MED/LAT W/SHVG Left 3/14/2023    Procedure: ARTHROSCOPY KNEE partial medial meniscectomy;  Surgeon: Winter Odonnell MD;  Location: [facility] OR;  Service: Orthopedics       History reviewed  No pertinent family history  Social History     Occupational History   • Not on file   Tobacco Use   • Smoking status: Every Day     Packs/day: 0 50     Types: Cigarettes   • Smokeless tobacco: Never   • Tobacco comments:     Last cigarette 3 13 23 at 2230   Vaping Use   • Vaping Use: Never used   Substance and Sexual Activity   • Alcohol use:  Yes     Alcohol/week: 3 0 standard drinks     Types: 3 Standard drinks or equivalent per week   • Drug use: Never   • Sexual activity: Not on file       Allergies   Allergen Reactions   • Penicillins Anaphylaxis         Current Outpatient Medications:   •  aspirin (ECOTRIN LOW STRENGTH) 81 mg EC tablet, Take 1 tablet (81 mg total) by mouth 2 (two) times a day (Patient not taking: Reported on 3/24/2023), Disp: 60 tablet, Rfl: 0  •  ondansetron (ZOFRAN-ODT) 4 mg disintegrating tablet, Take 1 tablet (4 mg total) by mouth every 8 (eight) hours as needed for nausea or vomiting (Patient not taking: Reported on 3/24/2023), Disp: 15 tablet, Rfl: 0  •  oxyCODONE (ROXICODONE) 5 immediate release tablet, Take 1 tablet (5 mg total) by mouth every 4 (four) hours as needed for moderate pain for up to 10 days Max Daily Amount: 30 mg (Patient not taking: Reported on 3/24/2023), Disp: 40 tablet, Rfl: 0      [facility]    I,:  Nnamdi Ramos am acting as a scribe while in the presence of the attending physician :       I,:  Winter Odonnell MD personally performed the services described in this documentation    as scribed in my presence :

## 2023-03-24 NOTE — LETTER
March 24, 2023     Patient: Michael Gutierres  YOB: 1960  Date of Visit: 3/24/2023      To Whom it May Concern:    Michael Gutierres is under my professional care  Abel Singletary was seen in my office on 3/24/2023  Abel Singletary is released to sedentary duty with limited walking and standing starting 3/27/23    If you have any questions or concerns, please don't hesitate to call           Sincerely,          Lukas Hopper MD        CC: No Recipients

## 2023-03-30 ENCOUNTER — EVALUATION (OUTPATIENT)
Dept: PHYSICAL THERAPY | Facility: REHABILITATION | Age: 63
End: 2023-03-30

## 2023-03-30 DIAGNOSIS — R26.2 DIFFICULTY WALKING: ICD-10-CM

## 2023-03-30 DIAGNOSIS — M25.562 LEFT KNEE PAIN, UNSPECIFIED CHRONICITY: ICD-10-CM

## 2023-03-30 DIAGNOSIS — R53.1 WEAKNESS: ICD-10-CM

## 2023-03-30 DIAGNOSIS — S83.242A ACUTE MEDIAL MENISCAL TEAR, LEFT, INITIAL ENCOUNTER: Primary | ICD-10-CM

## 2023-03-30 NOTE — PROGRESS NOTES
PT Evaluation     Today's date: 3/30/2023  Patient name: Keira Driscoll  : 1960  MRN: [id number]  Referring provider: Vane Wilson, *  Dx:   Encounter Diagnosis     ICD-10-CM    1  Acute medial meniscal tear, left, initial encounter  S83 242A Ambulatory Referral to Physical Therapy      2  Difficulty walking  R26 2       3  Left knee pain, unspecified chronicity  M25 562       4  Weakness  R53 1           Start Time: 1400  Stop Time: 1445  Total time in clinic (min): 45 minutes    Assessment  Assessment details: Problem List:  1) Knee & hip muscular weakness  2) Gait abnormalities    Keira Driscoll is a pleasant 61 y o  male who presents with signs and symptoms consistent with his surgical history  He has pain with active range of motion, hip and thigh weakness, and pain in weightbearing resulting in concern at no signs of improvement, wanting to avoid painkillers, and fear of not being able to keep active  No further referral appears necessary at this time based upon examination results  I expect he will improve with skilled physical therapy  Positive prognostic indicators include good understanding of diagnosis and treatment plan options and absence of peripheralization  Negative prognostic indicators include chronicity of symptoms, anxiety, hypertension, high symptom irritability, lack of centralization with movement, minimal changes in pain with movement and multiple concurrent orthopedic problems  Comparable signs:  1) weak and painful quadriceps MMT  2) Weak and painful hamstring MMT  Impairments: abnormal coordination, abnormal gait, abnormal muscle tone, abnormal or restricted ROM, activity intolerance, impaired balance, impaired physical strength, lacks appropriate home exercise program and pain with function    Symptom irritability: moderateUnderstanding of Dx/Px/POC: good   Prognosis: good    Goals  Short Term Goals:   1   Patient will demonstrate independence with HEP by providing return "demonstration of exercises  2  Patient will report decreased symptom intensity during activity by 50%    Long Term Goals:   1  Patient will improve FOTO to greater then goal  2  Patient will improve pain with activity to 2/10 or less  3  Patient will continue with HEP to allow for continued progress and function      Plan  Patient would benefit from: skilled physical therapy  Referral necessary: No  Planned modality interventions: biofeedback, cryotherapy and thermotherapy: hydrocollator packs  Planned therapy interventions: joint mobilization, manual therapy, muscle pump exercises, neuromuscular re-education, patient education, strengthening, stretching, therapeutic activities, therapeutic exercise, home exercise program, graded exercise, graded activity, gait training, functional ROM exercises, flexibility, body mechanics training, balance, abdominal trunk stabilization, activity modification, massage, Godinez taping and motor coordination training  Frequency: 2x week  Duration in weeks: 8  Treatment plan discussed with: patient        Subjective Evaluation    History of Present Illness  Date of surgery: 3/14/2023  Mechanism of injury: surgery and trauma  Mechanism of injury: \"Bertram\" says he sustained an injury at work 12/29/2022 where he slipped walking down stairs and twisted his left knee  He then presented to the ED for uncontrolled left knee pain after hearing a \"pop\" when descending stairs on 2/14/2023  He is currently s/p left knee arthroscopic partial medial menisectomy 3/14/23  He says he is having a lot of pain after his surgery, and feels a \"shock\" on both sides of his legs when he walks more than 5 blocks  When he sits, he gets a burning in the back of his leg, with patient pointing towards his bilateral hamstrings  He says he has a lot of trouble when he has to bend his knee too, and says he can put his shocks on, but it really hurts   He has 17 steps getting into his home and has to keep up with " his grand kids  His goals are to return to work, which will involve lifting >50lbs and squatting  He reports increased levels of knee pain because he fell on 3/28, and says he couldn't walk after  He had bruises on 3/29, but says they are gone today  Quality of life: fair    Pain  Current pain ratin  At best pain ratin  At worst pain ratin  Relieving factors: ice, relaxation and change in position  Aggravating factors: walking, standing, stair climbing, sitting and lifting  Progression: no change    Social Support  Steps to enter house: yes  17    Treatments  Previous treatment: physical therapy  Current treatment: medication  Patient Goals  Patient goals for therapy: decreased edema, decreased pain, improved balance, increased motion, increased strength, independence with ADLs/IADLs, return to work and return to sport/leisure activities          Objective      Palpation: Tender to palpation through patient's distal quadriceps, and hamstrings  He is tender to palpation at his fibular head, and has pinpoint tenderness at his patella  GAIT: Patient walks with an antalgic gait with decreased knee extension on involved side  Meniscal Tests:  Catching/Clicking/Locking no  Joint Line Tenderness: no  Pain with knee flexion overpressure: yes  Pain with knee extension overpressure: yes          MMT         AROM          PROM    Hip       L       R        L           R      L     R   Flex  (L1,L2,L3) 4 5       Extn  Abd  Add  IR          ER                   Knee         Ext (L3) 4 5 0      Flex (S2) 4 5 119               Ankle/foot         DF (L4) 5 5       PF (S1) 4 4       Sup         Pro         G  Toe (L5) 5 5            Straight leg raise:   L=  Negative    R= negative                  Segmental mobility:   Patella: not tested    Tibio-Femoral Joint: Hypomobile       Precautions: left meniscectomy 3/14         Manuals Neuro Re-Ed                                                                                                        Ther Ex                                                                                                                     Ther Activity                                       Gait Training                                       Modalities

## 2023-04-03 ENCOUNTER — TELEPHONE (OUTPATIENT)
Dept: OBGYN CLINIC | Facility: MEDICAL CENTER | Age: 63
End: 2023-04-03

## 2023-04-03 ENCOUNTER — TELEPHONE (OUTPATIENT)
Dept: OBGYN CLINIC | Facility: HOSPITAL | Age: 63
End: 2023-04-03

## 2023-04-03 NOTE — TELEPHONE ENCOUNTER
[id number]  HI, this patients PT reached out to us stating he fell landing on his operative knee  I spoke to Dr Marilu Bond and he said he is happy to see this patient back sooner if he is still having symptoms  I attempted to call him but was disconnected multiple times  Can you try and contact him today? Thank you!        LM for patient to call office back

## 2023-04-03 NOTE — TELEPHONE ENCOUNTER
Caller: Patient     Doctor: N/A     Reason for call: Cancel PT appointment   Patient was transferred to PT

## 2023-04-04 NOTE — PROGRESS NOTES
Reyes Wilson last seen at initial evaluation on 3/30 and did follow up with any treatment sessions  Patient is self discharging from physical therapy due to financial difficulties and need to return to work

## (undated) DEVICE — SYRINGE 20ML LL

## (undated) DEVICE — SUT MONOCRYL 2-0 SH 27 IN Y417H

## (undated) DEVICE — GLOVE SRG BIOGEL 6.5

## (undated) DEVICE — 3M™ IOBAN™ 2 ANTIMICROBIAL INCISE DRAPE 6650EZ: Brand: IOBAN™ 2

## (undated) DEVICE — INTENDED FOR TISSUE SEPARATION, AND OTHER PROCEDURES THAT REQUIRE A SHARP SURGICAL BLADE TO PUNCTURE OR CUT.: Brand: BARD-PARKER ® CARBON RIB-BACK BLADES

## (undated) DEVICE — GLOVE INDICATOR PI UNDERGLOVE SZ 7 BLUE

## (undated) DEVICE — CHLORAPREP HI-LITE 26ML ORANGE

## (undated) DEVICE — NEEDLE 18 G X 1 1/2

## (undated) DEVICE — TUBING EXTENSION 6 FT CONTIN WAVE

## (undated) DEVICE — BETHLEHEM UNIVERSAL  ARTHRO PK: Brand: CARDINAL HEALTH

## (undated) DEVICE — PADDING CAST 6IN COTTON STRL

## (undated) DEVICE — ASTOUND STANDARD SURGICAL GOWN, XL: Brand: CONVERTORS

## (undated) DEVICE — GAUZE SPONGES,16 PLY: Brand: CURITY

## (undated) DEVICE — TUBING SUCTION 5MM X 12 FT

## (undated) DEVICE — CYSTO TUBING TUR Y IRRIGATION

## (undated) DEVICE — COBAN 6 IN STERILE

## (undated) DEVICE — 4-PORT MANIFOLD: Brand: NEPTUNE 2

## (undated) DEVICE — PUDDLE VAC

## (undated) DEVICE — GLOVE INDICATOR PI UNDERGLOVE SZ 8.5 BLUE

## (undated) DEVICE — IMPERVIOUS STOCKINETTE: Brand: DEROYAL

## (undated) DEVICE — GLOVE SRG BIOGEL 8

## (undated) DEVICE — BLADE SHAVER DISSECTOR 4MM 13CM COOLCUT

## (undated) DEVICE — 3M™ STERI-STRIP™ REINFORCED ADHESIVE SKIN CLOSURES, R1547, 1/2 IN X 4 IN (12 MM X 100 MM), 6 STRIPS/ENVELOPE: Brand: 3M™ STERI-STRIP™

## (undated) DEVICE — ABDOMINAL PAD: Brand: DERMACEA

## (undated) DEVICE — TIBURON SPLIT SHEET: Brand: CONVERTORS

## (undated) DEVICE — ACE WRAP 6 IN UNSTERILE

## (undated) DEVICE — GLOVE SRG BIOGEL 7.5

## (undated) DEVICE — 3M™ STERI-DRAPE™ U-DRAPE 1015: Brand: STERI-DRAPE™

## (undated) DEVICE — SCD SEQUENTIAL COMPRESSION COMFORT SLEEVE MEDIUM KNEE LENGTH: Brand: KENDALL SCD